# Patient Record
(demographics unavailable — no encounter records)

---

## 2017-10-12 NOTE — ULT
COMPLETE ABDOMINAL ULTRASOUND:

 

COMPARISON: 

None.

 

HISTORY: 

Chronic kidney disease.  Cystic abnormality seen in the liver on prior renal ultrasound.

 

TECHNIQUE: 

Multiplanar, gray scale, and color Doppler images were obtained in a complete abdominal ultrasound.

 

FINDINGS: 

There are numerous anechoic cysts in the liver.  The largest measures 10.2 cm in greatest dimension.
  There is no biliary dilatation.  The gallbladder is normal without stones, sludge, gallbladder wal
l thickening, or pericholecystic fluid.  The common bile duct is normal measuring 3 mm.

 

The aorta and inferior vena cava are normal in caliber.  The visualized portions of the pancreas are
 unremarkable.  The spleen is normal in echogenicity without focal lesion and measures 11.6 cm in le
ngth.

 

There are numerous cysts seen in both kidneys.  The largest is seen on the left measuring 3.2 cm in 
size.  The kidneys demonstrate increased cortical echogenicity and measure 9.9 and 9.6 cm in length 
on the right and left, respectively.  There is no evidence of hydronephrosis or shadowing calculi.

 

IMPRESSION: 

1.  Hepatic cyst.

2.  Bilateral renal cysts.

3.  Increased echogenicity of the kidney sis likely secondary to chronic medial renal disease.

 

POS: [facility]